# Patient Record
Sex: MALE | Race: WHITE | NOT HISPANIC OR LATINO | Employment: FULL TIME | URBAN - METROPOLITAN AREA
[De-identification: names, ages, dates, MRNs, and addresses within clinical notes are randomized per-mention and may not be internally consistent; named-entity substitution may affect disease eponyms.]

---

## 2023-07-07 ENCOUNTER — OFFICE VISIT (OUTPATIENT)
Dept: FAMILY MEDICINE | Facility: CLINIC | Age: 48
End: 2023-07-07
Payer: COMMERCIAL

## 2023-07-07 ENCOUNTER — HOSPITAL ENCOUNTER (OUTPATIENT)
Dept: RADIOLOGY | Facility: HOSPITAL | Age: 48
Discharge: HOME | End: 2023-07-07
Attending: FAMILY MEDICINE
Payer: COMMERCIAL

## 2023-07-07 VITALS
RESPIRATION RATE: 18 BRPM | HEART RATE: 95 BPM | WEIGHT: 234.19 LBS | BODY MASS INDEX: 31.72 KG/M2 | HEIGHT: 72 IN | DIASTOLIC BLOOD PRESSURE: 80 MMHG | SYSTOLIC BLOOD PRESSURE: 160 MMHG | OXYGEN SATURATION: 98 %

## 2023-07-07 DIAGNOSIS — R03.0 ELEVATED BLOOD PRESSURE READING: Primary | ICD-10-CM

## 2023-07-07 DIAGNOSIS — K59.00 CONSTIPATION, UNSPECIFIED CONSTIPATION TYPE: ICD-10-CM

## 2023-07-07 DIAGNOSIS — R10.31 RIGHT LOWER QUADRANT PAIN: ICD-10-CM

## 2023-07-07 DIAGNOSIS — R10.9 ABDOMINAL PAIN, UNSPECIFIED ABDOMINAL LOCATION: ICD-10-CM

## 2023-07-07 DIAGNOSIS — Z13.220 ENCOUNTER FOR LIPID SCREENING FOR CARDIOVASCULAR DISEASE: ICD-10-CM

## 2023-07-07 DIAGNOSIS — Z13.6 ENCOUNTER FOR LIPID SCREENING FOR CARDIOVASCULAR DISEASE: ICD-10-CM

## 2023-07-07 PROCEDURE — 99204 OFFICE O/P NEW MOD 45 MIN: CPT | Mod: ,,, | Performed by: FAMILY MEDICINE

## 2023-07-07 PROCEDURE — 99204 PR OFFICE/OUTPT VISIT, NEW, LEVL IV, 45-59 MIN: ICD-10-PCS | Mod: ,,, | Performed by: FAMILY MEDICINE

## 2023-07-07 PROCEDURE — 74177 CT ABD & PELVIS W/CONTRAST: CPT | Mod: TC

## 2023-07-07 PROCEDURE — 99999 PR PBB SHADOW E&M-NEW PATIENT-LVL III: CPT | Mod: PBBFAC,,, | Performed by: FAMILY MEDICINE

## 2023-07-07 PROCEDURE — 74177 CT ABDOMEN PELVIS WITH CONTRAST: ICD-10-PCS | Mod: 26,,, | Performed by: RADIOLOGY

## 2023-07-07 PROCEDURE — 74177 CT ABD & PELVIS W/CONTRAST: CPT | Mod: 26,,, | Performed by: RADIOLOGY

## 2023-07-07 PROCEDURE — 99999 PR PBB SHADOW E&M-NEW PATIENT-LVL III: ICD-10-PCS | Mod: PBBFAC,,, | Performed by: FAMILY MEDICINE

## 2023-07-07 PROCEDURE — 25500020 PHARM REV CODE 255: Performed by: FAMILY MEDICINE

## 2023-07-07 RX ORDER — TIZANIDINE 4 MG/1
4 TABLET ORAL EVERY 6 HOURS PRN
Qty: 40 TABLET | Refills: 0 | Status: SHIPPED | OUTPATIENT
Start: 2023-07-07 | End: 2023-07-17

## 2023-07-07 RX ORDER — DEXTROAMPHETAMINE SACCHARATE, AMPHETAMINE ASPARTATE MONOHYDRATE, DEXTROAMPHETAMINE SULFATE AND AMPHETAMINE SULFATE 7.5; 7.5; 7.5; 7.5 MG/1; MG/1; MG/1; MG/1
30 CAPSULE, EXTENDED RELEASE ORAL 2 TIMES DAILY
COMMUNITY

## 2023-07-07 RX ORDER — METHOCARBAMOL 750 MG/1
750 TABLET, FILM COATED ORAL 4 TIMES DAILY
Qty: 40 TABLET | Refills: 0 | Status: SHIPPED | OUTPATIENT
Start: 2023-07-07 | End: 2023-07-07

## 2023-07-07 RX ORDER — CLONAZEPAM 1 MG/1
1 TABLET ORAL 2 TIMES DAILY PRN
COMMUNITY

## 2023-07-07 RX ADMIN — IOHEXOL 100 ML: 350 INJECTION, SOLUTION INTRAVENOUS at 11:07

## 2023-07-07 NOTE — PROGRESS NOTES
Patient called and states she is feeling a little worse with her cold symptoms this afternoon and is thinking maybe she would like to go ahead and have the antibiotic that Dr. Catie Ta offered. Subjective:       Patient ID: Kelvin Chi is a 47 y.o. male.    Chief Complaint: Abdominal Pain, Constipation, and Establish Care    Presents today to establish care.     Wife wants him to get established and wants an overall work up.    Blood pressure is elevated.     Has been having abdominal pain and constipation.     On adderall and klonopin from psychiatry.     Abdominal Pain  Associated symptoms include constipation. Pertinent negatives include no fever.   Constipation  Associated symptoms include abdominal pain. Pertinent negatives include no fever.   Review of Systems   Constitutional:  Negative for activity change, appetite change, fatigue and fever.   Respiratory:  Negative for shortness of breath.    Gastrointestinal:  Positive for abdominal pain and constipation.   Integumentary:  Negative for rash.       Objective:      Physical Exam  Vitals and nursing note reviewed.   Constitutional:       General: He is not in acute distress.     Appearance: He is not ill-appearing.   Cardiovascular:      Rate and Rhythm: Normal rate and regular rhythm.      Heart sounds: No murmur heard.  Pulmonary:      Effort: Pulmonary effort is normal.      Breath sounds: Normal breath sounds. No wheezing.   Abdominal:      General: There is distension.      Palpations: Abdomen is soft. There is no mass.      Tenderness: There is abdominal tenderness. There is no right CVA tenderness, left CVA tenderness, guarding or rebound.      Hernia: No hernia is present.   Skin:     General: Skin is warm and dry.      Findings: No rash.   Neurological:      Mental Status: He is alert.   Psychiatric:         Mood and Affect: Mood normal.         Behavior: Behavior normal.       Assessment:       1. Elevated blood pressure reading    2. Encounter for lipid screening for cardiovascular disease    3. Constipation, unspecified constipation type    4. Abdominal pain, unspecified abdominal location    5. Right lower quadrant pain        Plan:        Problem List Items Addressed This Visit    None  Visit Diagnoses       Elevated blood pressure reading    -  Primary    Relevant Orders    CBC Auto Differential    Comprehensive Metabolic Panel    TSH    Encounter for lipid screening for cardiovascular disease        Relevant Orders    Lipid Panel    Constipation, unspecified constipation type        Relevant Orders    CBC Auto Differential    Comprehensive Metabolic Panel    TSH    Abdominal pain, unspecified abdominal location        Relevant Orders    CBC Auto Differential    Comprehensive Metabolic Panel    TSH    Right lower quadrant pain        Relevant Orders    Urinalysis, Reflex to Urine Culture Urine, Clean Catch    CT Abdomen Pelvis With Contrast

## 2023-07-13 DIAGNOSIS — Z11.59 NEED FOR HEPATITIS C SCREENING TEST: ICD-10-CM

## 2024-02-06 DIAGNOSIS — Z12.11 COLON CANCER SCREENING: ICD-10-CM

## 2024-07-09 ENCOUNTER — HOSPITAL ENCOUNTER (OUTPATIENT)
Dept: RADIOLOGY | Facility: HOSPITAL | Age: 49
Discharge: HOME | End: 2024-07-09
Attending: NURSE PRACTITIONER
Payer: COMMERCIAL

## 2024-07-09 ENCOUNTER — OFFICE VISIT (OUTPATIENT)
Dept: FAMILY MEDICINE | Facility: CLINIC | Age: 49
End: 2024-07-09
Payer: COMMERCIAL

## 2024-07-09 VITALS
SYSTOLIC BLOOD PRESSURE: 150 MMHG | HEIGHT: 72 IN | HEART RATE: 67 BPM | DIASTOLIC BLOOD PRESSURE: 84 MMHG | BODY MASS INDEX: 32.51 KG/M2 | WEIGHT: 240 LBS | OXYGEN SATURATION: 97 %

## 2024-07-09 DIAGNOSIS — M54.42 ACUTE MIDLINE LOW BACK PAIN WITH BILATERAL SCIATICA: Primary | ICD-10-CM

## 2024-07-09 DIAGNOSIS — M54.42 ACUTE MIDLINE LOW BACK PAIN WITH BILATERAL SCIATICA: ICD-10-CM

## 2024-07-09 DIAGNOSIS — M54.41 ACUTE MIDLINE LOW BACK PAIN WITH BILATERAL SCIATICA: ICD-10-CM

## 2024-07-09 DIAGNOSIS — M54.41 ACUTE MIDLINE LOW BACK PAIN WITH BILATERAL SCIATICA: Primary | ICD-10-CM

## 2024-07-09 DIAGNOSIS — M62.830 SPASM OF MUSCLE OF LOWER BACK: ICD-10-CM

## 2024-07-09 PROCEDURE — 96372 THER/PROPH/DIAG INJ SC/IM: CPT | Mod: S$GLB,,, | Performed by: NURSE PRACTITIONER

## 2024-07-09 PROCEDURE — 73521 X-RAY EXAM HIPS BI 2 VIEWS: CPT | Mod: 26,,, | Performed by: RADIOLOGY

## 2024-07-09 PROCEDURE — 72100 X-RAY EXAM L-S SPINE 2/3 VWS: CPT | Mod: TC,PN

## 2024-07-09 PROCEDURE — 99214 OFFICE O/P EST MOD 30 MIN: CPT | Mod: 25,S$GLB,, | Performed by: NURSE PRACTITIONER

## 2024-07-09 PROCEDURE — 72100 X-RAY EXAM L-S SPINE 2/3 VWS: CPT | Mod: 26,,, | Performed by: RADIOLOGY

## 2024-07-09 PROCEDURE — 99999 PR PBB SHADOW E&M-EST. PATIENT-LVL III: CPT | Mod: PBBFAC,,, | Performed by: NURSE PRACTITIONER

## 2024-07-09 PROCEDURE — 73521 X-RAY EXAM HIPS BI 2 VIEWS: CPT | Mod: TC,PN

## 2024-07-09 RX ORDER — HYDROCODONE BITARTRATE AND ACETAMINOPHEN 5; 325 MG/1; MG/1
1 TABLET ORAL EVERY 8 HOURS PRN
Qty: 20 TABLET | Refills: 0 | Status: SHIPPED | OUTPATIENT
Start: 2024-07-09

## 2024-07-09 RX ORDER — TIZANIDINE 4 MG/1
4 TABLET ORAL EVERY 6 HOURS PRN
Qty: 30 TABLET | Refills: 0 | Status: SHIPPED | OUTPATIENT
Start: 2024-07-09

## 2024-07-09 RX ORDER — METHYLPREDNISOLONE ACETATE 40 MG/ML
40 INJECTION, SUSPENSION INTRA-ARTICULAR; INTRALESIONAL; INTRAMUSCULAR; SOFT TISSUE
Status: COMPLETED | OUTPATIENT
Start: 2024-07-09 | End: 2024-07-09

## 2024-07-09 RX ORDER — KETOROLAC TROMETHAMINE 30 MG/ML
30 INJECTION, SOLUTION INTRAMUSCULAR; INTRAVENOUS ONCE
Status: COMPLETED | OUTPATIENT
Start: 2024-07-09 | End: 2024-07-09

## 2024-07-09 RX ADMIN — METHYLPREDNISOLONE ACETATE 40 MG: 40 INJECTION, SUSPENSION INTRA-ARTICULAR; INTRALESIONAL; INTRAMUSCULAR; SOFT TISSUE at 04:07

## 2024-07-09 RX ADMIN — KETOROLAC TROMETHAMINE 30 MG: 30 INJECTION, SOLUTION INTRAMUSCULAR; INTRAVENOUS at 04:07

## 2024-07-09 NOTE — PROGRESS NOTES
Subjective:       Patient ID: Kelvin Chi is a 48 y.o. male.    Chief Complaint: No chief complaint on file.    Kelvin Chi presents to the clinic today for low back pain, impaired mobility  Accompanied by wife  Established within the clinic, new patien to myself    Under the care of the following:  PCP: Dr. Hernandez  Behaviioral Health: Estrellita Wilburn    Reports Saturday he got his  stuck in the ditch. He picked up on the  to move it out of the ditch-working against gravity/hill.  is a zero turn. That evening he began to have low lack pain. Started using otc Ibuprofen, topicals, stretching, massage, ice baths etc as he reports that he has a history of low back pain with sciatica. Has been experiencing worsening back pain with difficulty sitting/standing for prolonged periods. Was unable to work yesterday because he was unable to sit in his truck to drive to work.   Denies any bowel or bladder changes.   Deep pressure like pain that radiates down both his posterior legs.   Wife attempted to get him to go to the ER, but he would not go.       Review of Systems    Patient Active Problem List   Diagnosis    Tobacco use       Objective:      Physical Exam  Constitutional:       Comments: Appears acutely uncomfortable. Facial grimacing, multiple position changes in efforts to get comfortable. Initially was in wheelchair. End of visit patient was standing.    Cardiovascular:      Rate and Rhythm: Normal rate and regular rhythm.      Heart sounds: Normal heart sounds. No murmur heard.  Pulmonary:      Effort: Pulmonary effort is normal. No respiratory distress.      Breath sounds: Normal breath sounds.   Musculoskeletal:      Lumbar back: Bony tenderness present.        Back:       Right lower leg: No edema.      Left lower leg: No edema.   Skin:     General: Skin is warm and dry.      Findings: No rash.   Neurological:      Mental Status: He is alert.         Lab Results   Component Value  Date    WBC 11.58 07/07/2023    HGB 14.9 07/07/2023    HCT 44.3 07/07/2023     07/07/2023    CHOL 225 (H) 07/07/2023    TRIG 190 (H) 07/07/2023    HDL 42 07/07/2023    ALT 25 07/07/2023    AST 17 07/07/2023     07/07/2023    K 4.7 07/07/2023     07/07/2023    CREATININE 1.1 07/07/2023    BUN 14 07/07/2023    CO2 25 07/07/2023    TSH 1.022 07/07/2023     The 10-year ASCVD risk score (Dipti BARBER, et al., 2019) is: 14%    Values used to calculate the score:      Age: 48 years      Sex: Male      Is Non- : No      Diabetic: No      Tobacco smoker: Yes      Systolic Blood Pressure: 150 mmHg      Is BP treated: No      HDL Cholesterol: 42 mg/dL      Total Cholesterol: 225 mg/dL  Visit Vitals  BP (!) 150/84 (BP Location: Right arm, Patient Position: Sitting, BP Method: Large (Manual))   Pulse 67   Ht 6' (1.829 m)   Wt 108.9 kg (240 lb)   SpO2 97%   BMI 32.55 kg/m²      Assessment:       1. Acute midline low back pain with bilateral sciatica    2. Spasm of muscle of lower back        Plan:       1. Acute midline low back pain with bilateral sciatica  -     ketorolac injection 30 mg  -     methylPREDNISolone acetate injection 40 mg  -     HYDROcodone-acetaminophen (NORCO) 5-325 mg per tablet; Take 1 tablet by mouth every 8 (eight) hours as needed for Pain.  Dispense: 20 tablet; Refill: 0  -     X-Ray Lumbar Spine AP And Lateral; Future; Expected date: 07/09/2024  -     X-Ray Hips Bilateral 2 View Incl AP Pelvis; Future; Expected date: 07/09/2024  Obtain image for review.    reviewed- no red flags noted. Short term supply of medications provided only. Patient voiced understanding no refills provided. Discussed medications in detail-   Continue supportive treatment: topicals, stretching, rest, ice vs heat  Continued s/sx or any worsening s/sx- ER: voiced understanding   2. Spasm of muscle of lower back  -     tiZANidine (ZANAFLEX) 4 MG tablet; Take 1 tablet (4 mg total) by mouth  every 6 (six) hours as needed (muscle spasm).  Dispense: 30 tablet; Refill: 0       No follow-ups on file.

## 2024-07-09 NOTE — LETTER
July 9, 2024      [facility]  [address], [address]  [facility] [facility] [place] [address]  Phone: [phone]  Fax: [phone]       Patient: Kelvin Chi   YOB: 1975  Date of Visit: 07/09/2024    To Whom It May Concern:    Leia Chi  was at [facility] on 07/09/2024. Please excuse from work Monday July 8,2024- July 10,2024. He may return to work with no restrictions July 11,2024.   If you have any questions or concerns, or if I can be of further assistance, please do not hesitate to contact me.    Sincerely,      Stefanie Pedersen NP

## 2024-08-15 ENCOUNTER — OFFICE VISIT (OUTPATIENT)
Dept: FAMILY MEDICINE | Facility: CLINIC | Age: 49
End: 2024-08-15
Payer: COMMERCIAL

## 2024-08-15 ENCOUNTER — TELEPHONE (OUTPATIENT)
Dept: FAMILY MEDICINE | Facility: CLINIC | Age: 49
End: 2024-08-15
Payer: COMMERCIAL

## 2024-08-15 VITALS
DIASTOLIC BLOOD PRESSURE: 82 MMHG | SYSTOLIC BLOOD PRESSURE: 136 MMHG | BODY MASS INDEX: 31.86 KG/M2 | WEIGHT: 235.19 LBS | OXYGEN SATURATION: 97 % | HEIGHT: 72 IN | RESPIRATION RATE: 12 BRPM | HEART RATE: 71 BPM

## 2024-08-15 DIAGNOSIS — L02.12 FURUNCLE OF NECK: Primary | ICD-10-CM

## 2024-08-15 PROCEDURE — 99999 PR PBB SHADOW E&M-EST. PATIENT-LVL IV: CPT | Mod: PBBFAC,,, | Performed by: NURSE PRACTITIONER

## 2024-08-15 PROCEDURE — 99214 OFFICE O/P EST MOD 30 MIN: CPT | Mod: S$GLB,,, | Performed by: NURSE PRACTITIONER

## 2024-08-15 RX ORDER — CEPHALEXIN 500 MG/1
500 CAPSULE ORAL EVERY 6 HOURS
Qty: 40 CAPSULE | Refills: 0 | Status: SHIPPED | OUTPATIENT
Start: 2024-08-15 | End: 2024-08-25

## 2024-08-15 RX ORDER — MUPIROCIN 20 MG/G
OINTMENT TOPICAL 3 TIMES DAILY
Qty: 22 G | Refills: 0 | Status: SHIPPED | OUTPATIENT
Start: 2024-08-15

## 2024-08-15 NOTE — PROGRESS NOTES
Subjective:       Patient ID: Kelvin Chi is a 49 y.o. male.    Chief Complaint: Insect Bite (Neck and shoulder about a month)    Kelvin Chi presents to the clinic today with significant other for lesion to neck and right axillary region    Under the care of the following:  PCP: Dr. Hernandez  Behaviioral Health: Estrellita Akila    Areas have been present for about 1 month.   Belives they arouse from bug bite/insect bite sustained about 1 month ago when he had gotten his  stuck in a ditch  He has attempt to self expel/self treat both areas at home with a cleansed needle.  Reports initially did expel whitish/clear drainage, but none sense.         Review of Systems   Constitutional:  Negative for fever.   HENT:  Negative for ear pain, facial swelling and trouble swallowing.    Eyes:  Negative for pain and visual disturbance.   Musculoskeletal:  Negative for myalgias and neck pain.   Hematological:  Negative for adenopathy.       Patient Active Problem List   Diagnosis    Tobacco use       Objective:      Physical Exam  Constitutional:       Appearance: Normal appearance.   HENT:      Head: Normocephalic.      Right Ear: Ear canal normal.      Left Ear: Ear canal normal.   Eyes:      Extraocular Movements: Extraocular movements intact.      Conjunctiva/sclera: Conjunctivae normal.   Cardiovascular:      Rate and Rhythm: Normal rate.   Pulmonary:      Effort: Pulmonary effort is normal. No respiratory distress.   Skin:     General: Skin is warm and dry.      Comments: See attached images    Neurological:      Mental Status: He is alert and oriented to person, place, and time.   Psychiatric:         Mood and Affect: Mood normal.         Behavior: Behavior normal.               Lab Results   Component Value Date    WBC 11.58 07/07/2023    HGB 14.9 07/07/2023    HCT 44.3 07/07/2023     07/07/2023    CHOL 225 (H) 07/07/2023    TRIG 190 (H) 07/07/2023    HDL 42 07/07/2023    ALT 25 07/07/2023    AST 17  07/07/2023     07/07/2023    K 4.7 07/07/2023     07/07/2023    CREATININE 1.1 07/07/2023    BUN 14 07/07/2023    CO2 25 07/07/2023    TSH 1.022 07/07/2023     The 10-year ASCVD risk score (Dipti BARBER, et al., 2019) is: 12.5%    Values used to calculate the score:      Age: 49 years      Sex: Male      Is Non- : No      Diabetic: No      Tobacco smoker: Yes      Systolic Blood Pressure: 136 mmHg      Is BP treated: No      HDL Cholesterol: 42 mg/dL      Total Cholesterol: 225 mg/dL  Visit Vitals  /82 (BP Location: Right arm, Patient Position: Sitting, BP Method: Medium (Manual))   Pulse 71   Resp 12   Ht 6' (1.829 m)   Wt 106.7 kg (235 lb 3.2 oz)   SpO2 97%   BMI 31.90 kg/m²      Assessment:       1. Carbuncle and Furuncle of Neck        Plan:       1. Carbuncle and Furuncle of Neck  -     cephALEXin (KEFLEX) 500 MG capsule; Take 1 capsule (500 mg total) by mouth every 6 (six) hours. for 10 days  Dispense: 40 capsule; Refill: 0  -     mupirocin (BACTROBAN) 2 % ointment; Apply topically 3 (three) times daily.  Dispense: 22 g; Refill: 0  Take medication as prescribed with food until all gone  Warm compresses   DO NOT pick, poke, scratch, attempt to self treat/drain area   Any worsening to areas rtc/ difficulty swallowing- fever/chills/neurological changes- ER      No follow-ups on file.

## 2024-08-15 NOTE — TELEPHONE ENCOUNTER
----- Message from Rachel Dow sent at 8/15/2024 11:05 AM CDT -----  Pt said [place], [place]. Please call back to advise thank you    [phone]

## 2025-08-29 ENCOUNTER — LAB VISIT (OUTPATIENT)
Dept: LAB | Facility: HOSPITAL | Age: 50
End: 2025-08-29
Payer: COMMERCIAL

## 2025-08-29 ENCOUNTER — OFFICE VISIT (OUTPATIENT)
Dept: FAMILY MEDICINE | Facility: CLINIC | Age: 50
End: 2025-08-29
Payer: COMMERCIAL

## 2025-08-29 VITALS
SYSTOLIC BLOOD PRESSURE: 126 MMHG | HEIGHT: 72 IN | RESPIRATION RATE: 16 BRPM | HEART RATE: 61 BPM | BODY MASS INDEX: 32.23 KG/M2 | DIASTOLIC BLOOD PRESSURE: 82 MMHG | WEIGHT: 238 LBS | OXYGEN SATURATION: 96 %

## 2025-08-29 DIAGNOSIS — Z00.00 ANNUAL PHYSICAL EXAM: ICD-10-CM

## 2025-08-29 DIAGNOSIS — R53.83 FATIGUE, UNSPECIFIED TYPE: Primary | ICD-10-CM

## 2025-08-29 DIAGNOSIS — Z72.0 CURRENT NICOTINE USE: ICD-10-CM

## 2025-08-29 DIAGNOSIS — Z11.59 ENCOUNTER FOR HEPATITIS C SCREENING TEST FOR LOW RISK PATIENT: ICD-10-CM

## 2025-08-29 DIAGNOSIS — R53.83 FATIGUE, UNSPECIFIED TYPE: ICD-10-CM

## 2025-08-29 DIAGNOSIS — Z11.4 SCREENING FOR HIV (HUMAN IMMUNODEFICIENCY VIRUS): ICD-10-CM

## 2025-08-29 DIAGNOSIS — Z13.29 SCREENING FOR THYROID DISORDER: ICD-10-CM

## 2025-08-29 DIAGNOSIS — Z13.220 SCREENING FOR CHOLESTEROL LEVEL: ICD-10-CM

## 2025-08-29 DIAGNOSIS — R06.83 SNORING: ICD-10-CM

## 2025-08-29 DIAGNOSIS — Z13.1 SCREENING FOR DIABETES MELLITUS: ICD-10-CM

## 2025-08-29 LAB
ABSOLUTE EOSINOPHIL (OHS): 0.34 K/UL
ABSOLUTE MONOCYTE (OHS): 0.82 K/UL (ref 0.3–1)
ABSOLUTE NEUTROPHIL COUNT (OHS): 4.42 K/UL (ref 1.8–7.7)
ALBUMIN SERPL BCP-MCNC: 4 G/DL (ref 3.5–5.2)
ALP SERPL-CCNC: 75 UNIT/L (ref 40–150)
ALT SERPL W/O P-5'-P-CCNC: 36 UNIT/L (ref 10–44)
ANION GAP (OHS): 11 MMOL/L (ref 8–16)
AST SERPL-CCNC: 38 UNIT/L (ref 11–45)
BASOPHILS # BLD AUTO: 0.03 K/UL
BASOPHILS NFR BLD AUTO: 0.3 %
BILIRUB SERPL-MCNC: 0.3 MG/DL (ref 0.1–1)
BUN SERPL-MCNC: 15 MG/DL (ref 6–20)
CALCIUM SERPL-MCNC: 9.2 MG/DL (ref 8.7–10.5)
CHLORIDE SERPL-SCNC: 105 MMOL/L (ref 95–110)
CHOLEST SERPL-MCNC: 225 MG/DL (ref 120–199)
CHOLEST/HDLC SERPL: 4.7 {RATIO} (ref 2–5)
CO2 SERPL-SCNC: 21 MMOL/L (ref 23–29)
CREAT SERPL-MCNC: 1 MG/DL (ref 0.5–1.4)
EAG (OHS): 114 MG/DL (ref 68–131)
ERYTHROCYTE [DISTWIDTH] IN BLOOD BY AUTOMATED COUNT: 14.1 % (ref 11.5–14.5)
GFR SERPLBLD CREATININE-BSD FMLA CKD-EPI: >60 ML/MIN/1.73/M2
GLUCOSE SERPL-MCNC: 98 MG/DL (ref 70–110)
HBA1C MFR BLD: 5.6 % (ref 4–5.6)
HCT VFR BLD AUTO: 46.3 % (ref 40–54)
HDLC SERPL-MCNC: 48 MG/DL (ref 40–75)
HDLC SERPL: 21.3 % (ref 20–50)
HGB BLD-MCNC: 15.9 GM/DL (ref 14–18)
IMM GRANULOCYTES # BLD AUTO: 0.01 K/UL (ref 0–0.04)
IMM GRANULOCYTES NFR BLD AUTO: 0.1 % (ref 0–0.5)
LDLC SERPL CALC-MCNC: 155 MG/DL (ref 63–159)
LYMPHOCYTES # BLD AUTO: 3.6 K/UL (ref 1–4.8)
MCH RBC QN AUTO: 31.2 PG (ref 27–31)
MCHC RBC AUTO-ENTMCNC: 34.3 G/DL (ref 32–36)
MCV RBC AUTO: 91 FL (ref 82–98)
NONHDLC SERPL-MCNC: 177 MG/DL
NUCLEATED RBC (/100WBC) (OHS): 0 /100 WBC
PLATELET # BLD AUTO: 221 K/UL (ref 150–450)
PMV BLD AUTO: 10.5 FL (ref 9.2–12.9)
POTASSIUM SERPL-SCNC: 4.2 MMOL/L (ref 3.5–5.1)
PROT SERPL-MCNC: 6.9 GM/DL (ref 6–8.4)
RBC # BLD AUTO: 5.1 M/UL (ref 4.6–6.2)
RELATIVE EOSINOPHIL (OHS): 3.7 %
RELATIVE LYMPHOCYTE (OHS): 39 % (ref 18–48)
RELATIVE MONOCYTE (OHS): 8.9 % (ref 4–15)
RELATIVE NEUTROPHIL (OHS): 48 % (ref 38–73)
SODIUM SERPL-SCNC: 137 MMOL/L (ref 136–145)
TRIGL SERPL-MCNC: 110 MG/DL (ref 30–150)
TSH SERPL-ACNC: 0.68 UIU/ML (ref 0.4–4)
WBC # BLD AUTO: 9.22 K/UL (ref 3.9–12.7)

## 2025-08-29 PROCEDURE — 84478 ASSAY OF TRIGLYCERIDES: CPT

## 2025-08-29 PROCEDURE — 85025 COMPLETE CBC W/AUTO DIFF WBC: CPT

## 2025-08-29 PROCEDURE — 99213 OFFICE O/P EST LOW 20 MIN: CPT | Mod: S$PBB,,,

## 2025-08-29 PROCEDURE — 84403 ASSAY OF TOTAL TESTOSTERONE: CPT

## 2025-08-29 PROCEDURE — 82306 VITAMIN D 25 HYDROXY: CPT

## 2025-08-29 PROCEDURE — 86803 HEPATITIS C AB TEST: CPT

## 2025-08-29 PROCEDURE — 36415 COLL VENOUS BLD VENIPUNCTURE: CPT

## 2025-08-29 PROCEDURE — 87389 HIV-1 AG W/HIV-1&-2 AB AG IA: CPT

## 2025-08-29 PROCEDURE — 83036 HEMOGLOBIN GLYCOSYLATED A1C: CPT

## 2025-08-29 PROCEDURE — 82607 VITAMIN B-12: CPT

## 2025-08-29 PROCEDURE — 84443 ASSAY THYROID STIM HORMONE: CPT

## 2025-08-29 PROCEDURE — 82310 ASSAY OF CALCIUM: CPT

## 2025-08-29 PROCEDURE — 99999 PR PBB SHADOW E&M-EST. PATIENT-LVL III: CPT | Mod: PBBFAC,,,

## 2025-08-30 LAB
25(OH)D3+25(OH)D2 SERPL-MCNC: 29 NG/ML (ref 30–96)
HCV AB SERPL QL IA: NORMAL
HIV 1+2 AB+HIV1 P24 AG SERPL QL IA: NORMAL
TESTOST SERPL-MCNC: 603 NG/DL (ref 304–1227)
VIT B12 SERPL-MCNC: 327 PG/ML (ref 210–950)